# Patient Record
Sex: MALE | Race: WHITE
[De-identification: names, ages, dates, MRNs, and addresses within clinical notes are randomized per-mention and may not be internally consistent; named-entity substitution may affect disease eponyms.]

---

## 2018-02-23 ENCOUNTER — HOSPITAL ENCOUNTER (EMERGENCY)
Dept: HOSPITAL 11 - JP.ED | Age: 78
Discharge: HOME | End: 2018-02-23
Payer: MEDICARE

## 2018-02-23 VITALS — SYSTOLIC BLOOD PRESSURE: 154 MMHG | DIASTOLIC BLOOD PRESSURE: 81 MMHG

## 2018-02-23 DIAGNOSIS — E78.00: ICD-10-CM

## 2018-02-23 DIAGNOSIS — Z79.82: ICD-10-CM

## 2018-02-23 DIAGNOSIS — R61: ICD-10-CM

## 2018-02-23 DIAGNOSIS — I25.10: Primary | ICD-10-CM

## 2018-02-23 NOTE — EDM.PDOC
ED HPI GENERAL MEDICAL PROBLEM





- General


Chief Complaint: Cardiovascular Problem


Stated Complaint: heart   NAUSA


Time Seen by Provider: 02/23/18 11:30


Source of Information: Reports: Patient, Family


History Limitations: Reports: No Limitations





- History of Present Illness


INITIAL COMMENTS - FREE TEXT/NARRATIVE: 





pt arrived stating that he started having an episode in the middle of the nite 

when he became quite sweaty anf nauseated. He did not feel sob or have chest 

pain. He did not have chest tightness. He has a history of stopping his asa on 

tuesday because he is supposed to have shoulder surgery the first of the week. 

Since he does not have chest pain and is trying to prepare for surgery asa will 

not be given unless he develops pain and ekg  changes. 


Onset: Today, Sudden, Other ( started in the nite. )


Duration: Hour(s):


Location: Reports: Other (pt had no chest pain. He had alot of nausea and he 

became very sweaty. )


Associated Symptoms: Reports: Nausea/Vomiting, Weakness





- Related Data


 Allergies











Allergy/AdvReac Type Severity Reaction Status Date / Time


 


No Known Allergies Allergy   Verified 02/23/18 11:15











Home Meds: 


 Home Meds





Aspirin [Aspirin EC] 81 mg PO DAILY 09/26/16 [History]


atorvaSTATin [Lipitor] 40 mg PO BEDTIME 09/26/16 [History]











Past Medical History


HEENT History: Reports: Impaired Vision


Cardiovascular History: Reports: Bypass, CAD, High Cholesterol


Gastrointestinal History: Reports: Colon Polyp


Musculoskeletal History: Reports: Osteoarthritis


Oncologic (Cancer) History: Reports: Prostate, Squamous Cell Carcinoma


Dermatologic History: Reports: Other (See Below)


Other Dermatologic History: skin cancer squamous cell.





- Infectious Disease History


Infectious Disease History: Reports: Chicken Pox, Measles, Mumps





- Past Surgical History


Cardiovascular Surgical History: Reports: Carotid Endarterectomy, Coronary 

Artery Bypass


Male  Surgical History: Reports: Prostatectomy


Musculoskeletal Surgical History: Reports: Other (See Below)


Other Musculoskeletal Surgeries/Procedures:: upcoming rotator cuff surgery 

Early march 2018.


Dermatological Surgical History: Reports: Skin Biopsy





Social & Family History





- Tobacco Use


Smoking Status *Q: Never Smoker


Years of Tobacco use: 25


Packs/Tins Daily: 1





- Alcohol Use


Days Per Week of Alcohol Use: 7


Number of Drinks Per Day: 2


Total Drinks Per Week: 14





- Recreational Drug Use


Recreational Drug Use: No





ED ROS GENERAL





- Review of Systems


Review Of Systems: See Below


Constitutional: Reports: Weakness, Diaphoresis


HEENT: Reports: No Symptoms


Respiratory: Reports: Shortness of Breath, Other ( At this time he developed 

sob. )


Cardiovascular: Reports: Lightheadedness, Other (sweaty. )


Endocrine: Reports: No Symptoms


GI/Abdominal: Reports: No Symptoms


: Reports: No Symptoms


Musculoskeletal: Reports: No Symptoms


Skin: Reports: No Symptoms





ED EXAM, GENERAL





- Physical Exam


Exam: See Below


Free Text/Narrative:: 





pt had an episode of markd diaphores several times since 3 am. He did not have 

cheat pain. He was very mildly sob. 


Exam Limited By: No Limitations


General Appearance: Alert, No Apparent Distress, Anxious, Other (pupils equal 

and reactive. )


Ears: Normal TMs


Nose: Normal Inspection


Throat/Mouth: Normal Inspection


Head: Atraumatic


Neck: Normal Inspection


Respiratory/Chest: No Respiratory Distress


Cardiovascular: Regular Rate, Rhythm, Other (pt does not have chest wall 

tenderness. )


GI/Abdominal: Soft, Non-Tender


 (Male) Exam: Deferred


Rectal (Males) Exam: Deferred


Back Exam: Normal Inspection


Extremities: Normal Inspection


Neurological: Alert, Oriented, Normal Cognition


Psychiatric: Anxious





Course





- Vital Signs


Last Recorded V/S: 


 Last Vital Signs











Temp  36.5 C   02/23/18 13:50


 


Pulse  59 L  02/23/18 14:50


 


Resp  11 L  02/23/18 14:50


 


BP  154/81 H  02/23/18 14:50


 


Pulse Ox  98   02/23/18 14:50








 





Orthostatic Blood Pressure [     140/73


Standing]                        


Orthostatic Blood Pressure [     137/87


Sitting]                         


Orthostatic Blood Pressure [     161/81


Supine]                          











- Orders/Labs/Meds


Orders: 


 Active Orders 24 hr











 Category Date Time Status


 


 EKG Documentation Completion [RC] ASDIRECTED Care  02/23/18 11:11 Active


 


 EKG Documentation Completion [RC] ASDIRECTED Care  02/23/18 14:05 Active


 


 Orthostatic Vital Signs [RC] ASDIRECTED Care  02/23/18 13:42 Active


 


 EKG 12 Lead [EK] Routine Ther  02/23/18 11:11 Ordered


 


 EKG 12 Lead [EK] Routine Ther  02/23/18 14:04 Ordered











Labs: 


 Laboratory Tests











  02/23/18 02/23/18 02/23/18 Range/Units





  11:22 11:22 11:22 


 


WBC  6.3    (4.5-11.0)  K/uL


 


RBC  4.71    (4.30-5.90)  M/uL


 


Hgb  15.1 H    (12.0-15.0)  g/dL


 


Hct  44.4    (40.0-54.0)  %


 


MCV  94    (80-98)  fL


 


MCH  32 H    (27-31)  pg


 


MCHC  34    (32-36)  %


 


Plt Count  215    (150-400)  K/uL


 


Neut % (Auto)  72 H    (36-66)  %


 


Lymph % (Auto)  20 L    (24-44)  %


 


Mono % (Auto)  7 H    (2-6)  %


 


Eos % (Auto)  1 L    (2-4)  %


 


Baso % (Auto)  0    (0-1)  %


 


Sodium   141   (140-148)  mmol/L


 


Potassium   4.5   (3.6-5.2)  mmol/L


 


Chloride   106   (100-108)  mmol/L


 


Carbon Dioxide   27   (21-32)  mmol/L


 


Anion Gap   8.3   (5.0-14.0)  mmol/L


 


BUN   24 H   (7-18)  mg/dL


 


Creatinine   1.3   (0.8-1.3)  mg/dL


 


Est Cr Clr Drug Dosing   42.26   mL/min


 


Estimated GFR (MDRD)   53 L   (>60)  


 


Glucose   133 H   ()  mg/dL


 


Calcium   9.0   (8.5-10.1)  mg/dL


 


Total Bilirubin   0.5   (0.2-1.0)  mg/dL


 


AST   23   (15-37)  U/L


 


ALT   28   (12-78)  U/L


 


Alkaline Phosphatase   59   ()  U/L


 


Troponin I    < 0.017  (0.000-0.056)  ng/mL


 


C-Reactive Protein     (0.0-0.3)  mg/dL


 


Total Protein   6.7   (6.4-8.2)  g/dL


 


Albumin   3.7   (3.4-5.0)  g/dL


 


Globulin   3.0   (2.3-3.5)  g/dL


 


Albumin/Globulin Ratio   1.2   (1.2-2.2)  


 


Lipase     ()  U/L














  02/23/18 02/23/18 02/23/18 Range/Units





  12:17 12:26 14:48 


 


WBC     (4.5-11.0)  K/uL


 


RBC     (4.30-5.90)  M/uL


 


Hgb     (12.0-15.0)  g/dL


 


Hct     (40.0-54.0)  %


 


MCV     (80-98)  fL


 


MCH     (27-31)  pg


 


MCHC     (32-36)  %


 


Plt Count     (150-400)  K/uL


 


Neut % (Auto)     (36-66)  %


 


Lymph % (Auto)     (24-44)  %


 


Mono % (Auto)     (2-6)  %


 


Eos % (Auto)     (2-4)  %


 


Baso % (Auto)     (0-1)  %


 


Sodium     (140-148)  mmol/L


 


Potassium     (3.6-5.2)  mmol/L


 


Chloride     (100-108)  mmol/L


 


Carbon Dioxide     (21-32)  mmol/L


 


Anion Gap     (5.0-14.0)  mmol/L


 


BUN     (7-18)  mg/dL


 


Creatinine     (0.8-1.3)  mg/dL


 


Est Cr Clr Drug Dosing     mL/min


 


Estimated GFR (MDRD)     (>60)  


 


Glucose     ()  mg/dL


 


Calcium     (8.5-10.1)  mg/dL


 


Total Bilirubin     (0.2-1.0)  mg/dL


 


AST     (15-37)  U/L


 


ALT     (12-78)  U/L


 


Alkaline Phosphatase     ()  U/L


 


Troponin I    < 0.017  (0.000-0.056)  ng/mL


 


C-Reactive Protein  0.03    (0.0-0.3)  mg/dL


 


Total Protein     (6.4-8.2)  g/dL


 


Albumin     (3.4-5.0)  g/dL


 


Globulin     (2.3-3.5)  g/dL


 


Albumin/Globulin Ratio     (1.2-2.2)  


 


Lipase   200   ()  U/L














- Re-Assessments/Exams


Free Text/Narrative Re-Assessment/Exam: 





02/23/18 15:00


pt had a normal trop and no significant acute findings on his ekg.. He had a 

repeat ekg in 2.5 hours. He had a repeat trop.-- which is negative.  


02/23/18 15:16





02/24/18 09:15


PT WAS NOT GIVEN ASA BECAUSE HE DID NOT HAVE CHEST PAIN AND HE IS PREPARING FOR 

A SURGERY AND IS OFF ASA. 





Departure





- Departure


Time of Disposition: 15:25


Disposition: Home, Self-Care 01


Condition: Fair


Clinical Impression: 


 Diaphoresis, Coronary artery disease





Instructions:  Coronary Artery Disease, Male


Referrals: 


PCP,None [Primary Care Provider] - 


Forms:  ED Department Discharge


Care Plan Goals: 


 schedule a lexiscan  Monday.  rtc if pt has any further episodes.  Pt will 

have the lexiscan Monday to clear him for surgery.  





- My Orders


Last 24 Hours: 


My Active Orders





02/23/18 11:11


EKG Documentation Completion [RC] ASDIRECTED 


EKG 12 Lead [EK] Routine 





02/23/18 13:42


Orthostatic Vital Signs [RC] ASDIRECTED 





02/23/18 14:04


EKG 12 Lead [EK] Routine 





02/23/18 14:05


EKG Documentation Completion [RC] ASDIRECTED 














- Assessment/Plan


Last 24 Hours: 


My Active Orders





02/23/18 11:11


EKG Documentation Completion [RC] ASDIRECTED 


EKG 12 Lead [EK] Routine 





02/23/18 13:42


Orthostatic Vital Signs [RC] ASDIRECTED 





02/23/18 14:04


EKG 12 Lead [EK] Routine 





02/23/18 14:05


EKG Documentation Completion [RC] ASDIRECTED

## 2018-02-23 NOTE — CR
Chest 1V Frontal

 

FINDINGS: The patient has had a prior sternotomy. The heart and vascular structures are normal in mimi
earance. No infiltrates or effusions are demonstrated.

 

Impression:

1. No acute findings.

## 2019-09-18 ENCOUNTER — HOSPITAL ENCOUNTER (OUTPATIENT)
Dept: HOSPITAL 11 - JP.SDS | Age: 79
Discharge: HOME | End: 2019-09-18
Attending: SURGERY
Payer: MEDICARE

## 2019-09-18 VITALS — DIASTOLIC BLOOD PRESSURE: 72 MMHG | HEART RATE: 50 BPM | SYSTOLIC BLOOD PRESSURE: 115 MMHG

## 2019-09-18 DIAGNOSIS — R97.20: ICD-10-CM

## 2019-09-18 DIAGNOSIS — N18.3: ICD-10-CM

## 2019-09-18 DIAGNOSIS — Z95.1: ICD-10-CM

## 2019-09-18 DIAGNOSIS — Z86.010: ICD-10-CM

## 2019-09-18 DIAGNOSIS — I25.10: ICD-10-CM

## 2019-09-18 DIAGNOSIS — R19.4: Primary | ICD-10-CM

## 2019-09-18 DIAGNOSIS — Z80.0: ICD-10-CM

## 2019-09-18 PROCEDURE — 45378 DIAGNOSTIC COLONOSCOPY: CPT

## 2019-09-19 NOTE — OR
DATE OF PROCEDURE:  09/18/2019

 

SURGEON:  Brent Rubio MD

 

PREOPERATIVE DIAGNOSES:  Strong family history of colon cancer, father had colon cancer.

Personal history of polyps, change in bowel habits.

 

POSTOPERATIVE DIAGNOSES:  Unremarkable colonoscopy, strong family history of colon cancer.

Father had colon cancer.  Personal history of polyps, change in bowel habits, etiology

unknown.

 

PROCEDURE:  Colonoscopy to the cecum.

 

ANESTHESIA:  IV anesthesia with monitored anesthesia care.

 

INDICATION:  This 79-year-old white male is referred for a colonoscopy because of a change

in bowel habits, strong family history of colon cancer, and a personal history of polyps.

He says for the past 3 weeks or so, he has had pencil-thin stools.  He says his stools also

used to float and now they sink to the bottom of the bowl and their color has changed to a

tan.  He says the last colonoscopic exam was done 3 years ago.  I counseled him for a

colonoscopy with possible biopsy and/or polypectomy including risks and alternatives, and he

gave his informed consent to proceed.

 

DESCRIPTION OF PROCEDURE:  The patient was placed in the left lateral decubitus position.

IV anesthesia was administered by the Anesthesia Service.  Time-out was held.  A rectal exam

was performed, which was unremarkable.  The flexible video Olympus colonoscope was

introduced through his anus, up his rectum, and out his colon all the way to the cecum.

Once the cecum was reached, the scope was slowly withdrawn examining the mucosa throughout.

No mucosal abnormalities were noted.  The scope was retroflexed in the rectum with the

distal rectum appearing unremarkable.  The scope was straightened and removed.  He tolerated

the procedure well.

 

 

 

 

Brent Rubio MD

DD:  09/18/2019 08:06:22

DT:  09/18/2019 13:43:49

Job #:  [id number]